# Patient Record
Sex: FEMALE | Race: ASIAN | NOT HISPANIC OR LATINO | Employment: FULL TIME | URBAN - METROPOLITAN AREA
[De-identification: names, ages, dates, MRNs, and addresses within clinical notes are randomized per-mention and may not be internally consistent; named-entity substitution may affect disease eponyms.]

---

## 2020-09-01 ENCOUNTER — SURGERY - HEALTHEAST (OUTPATIENT)
Dept: SURGERY | Facility: CLINIC | Age: 40
End: 2020-09-01

## 2020-09-01 ENCOUNTER — ANESTHESIA - HEALTHEAST (OUTPATIENT)
Dept: SURGERY | Facility: CLINIC | Age: 40
End: 2020-09-01

## 2020-09-01 ASSESSMENT — MIFFLIN-ST. JEOR: SCORE: 1257.36

## 2020-09-02 ENCOUNTER — COMMUNICATION - HEALTHEAST (OUTPATIENT)
Dept: SCHEDULING | Facility: CLINIC | Age: 40
End: 2020-09-02

## 2020-09-16 ENCOUNTER — COMMUNICATION - HEALTHEAST (OUTPATIENT)
Dept: SURGERY | Facility: CLINIC | Age: 40
End: 2020-09-16

## 2021-06-04 VITALS — BODY MASS INDEX: 23.05 KG/M2 | WEIGHT: 135 LBS | HEIGHT: 64 IN

## 2021-06-11 NOTE — ANESTHESIA POSTPROCEDURE EVALUATION
Patient: Jessie Velez  Procedure(s):  APPENDECTOMY, LAPAROSCOPIC  Anesthesia type: general    Patient location: PACU  Last vitals:   Vitals Value Taken Time   /56 9/1/2020  4:10 PM   Temp 36.8  C (98.2  F) 9/1/2020  3:55 PM   Pulse 93 9/1/2020  4:17 PM   Resp 21 9/1/2020  4:17 PM   SpO2 100 % 9/1/2020  4:17 PM   Vitals shown include unvalidated device data.  Post vital signs: stable  Level of consciousness: awake and responds to simple questions  Post-anesthesia pain: pain controlled  Post-anesthesia nausea and vomiting: no  Pulmonary: unassisted, return to baseline  Cardiovascular: stable and blood pressure at baseline  Hydration: adequate  Anesthetic events: no    QCDR Measures:  ASA# 11 - Eleonora-op Cardiac Arrest: ASA11B - Patient did NOT experience unanticipated cardiac arrest  ASA# 12 - Eleonora-op Mortality Rate: ASA12B - Patient did NOT die  ASA# 13 - PACU Re-Intubation Rate: ASA13B - Patient did NOT require a new airway mgmt  ASA# 10 - Composite Anes Safety: ASA10A - No serious adverse event    Additional Notes:

## 2021-06-11 NOTE — ANESTHESIA PREPROCEDURE EVALUATION
Anesthesia Evaluation      Patient summary reviewed   No history of anesthetic complications     Airway   Mallampati: II  Neck ROM: full   Pulmonary - negative ROS and normal exam                          Cardiovascular - negative ROS and normal exam   Neuro/Psych - negative ROS     Endo/Other - negative ROS      GI/Hepatic/Renal - negative ROS           Dental - normal exam                        Anesthesia Plan  Planned anesthetic: general endotracheal    ASA 1 - emergent   Induction: intravenous   Anesthetic plan and risks discussed with: patient and spouse    Post-op plan: routine recovery

## 2021-06-11 NOTE — TELEPHONE ENCOUNTER
Attempted to reach patient for post-operative phone call. Left message for patient asking they return my call to discuss their progress.

## 2021-06-11 NOTE — ANESTHESIA CARE TRANSFER NOTE
Last vitals:   Vitals:    09/01/20 1555   BP: 105/58   Pulse: 100   Resp: 16   Temp: 36.8  C (98.2  F)   SpO2: 100%     Patient's level of consciousness is drowsy  Spontaneous respirations: yes  Maintains airway independently: yes  Dentition unchanged: yes  Oropharynx: oropharynx clear of all foreign objects    QCDR Measures:  ASA# 20 - Surgical Safety Checklist: WHO surgical safety checklist completed prior to induction    PQRS# 430 - Adult PONV Prevention: 4558F - Pt received => 2 anti-emetic agents (different classes) preop & intraop  ASA# 8 - Peds PONV Prevention: NA - Not pediatric patient, not GA or 2 or more risk factors NOT present  PQRS# 424 - Eleonora-op Temp Management: 4559F - At least one body temp DOCUMENTED => 35.5C or 95.9F within required timeframe  PQRS# 426 - PACU Transfer Protocol: - Transfer of care checklist used  ASA# 14 - Acute Post-op Pain: ASA14B - Patient did NOT experience pain >= 7 out of 10

## 2021-06-16 PROBLEM — K35.80 ACUTE APPENDICITIS, UNSPECIFIED ACUTE APPENDICITIS TYPE: Status: ACTIVE | Noted: 2020-09-01

## 2021-06-16 PROBLEM — K37 APPENDICITIS: Status: ACTIVE | Noted: 2020-09-01

## 2024-03-05 ENCOUNTER — LAB REQUISITION (OUTPATIENT)
Dept: LAB | Facility: CLINIC | Age: 44
End: 2024-03-05

## 2024-03-05 DIAGNOSIS — Z13.6 ENCOUNTER FOR SCREENING FOR CARDIOVASCULAR DISORDERS: ICD-10-CM

## 2024-03-05 DIAGNOSIS — Z13.1 ENCOUNTER FOR SCREENING FOR DIABETES MELLITUS: ICD-10-CM

## 2024-03-05 PROCEDURE — 80061 LIPID PANEL: CPT | Performed by: STUDENT IN AN ORGANIZED HEALTH CARE EDUCATION/TRAINING PROGRAM

## 2024-03-05 PROCEDURE — 80048 BASIC METABOLIC PNL TOTAL CA: CPT | Performed by: STUDENT IN AN ORGANIZED HEALTH CARE EDUCATION/TRAINING PROGRAM

## 2024-03-07 LAB
ANION GAP SERPL CALCULATED.3IONS-SCNC: 12 MMOL/L (ref 7–15)
BUN SERPL-MCNC: 8.7 MG/DL (ref 6–20)
CALCIUM SERPL-MCNC: 9.7 MG/DL (ref 8.6–10)
CHLORIDE SERPL-SCNC: 103 MMOL/L (ref 98–107)
CHOLEST SERPL-MCNC: 211 MG/DL
CREAT SERPL-MCNC: 0.63 MG/DL (ref 0.51–0.95)
DEPRECATED HCO3 PLAS-SCNC: 24 MMOL/L (ref 22–29)
EGFRCR SERPLBLD CKD-EPI 2021: >90 ML/MIN/1.73M2
FASTING STATUS PATIENT QL REPORTED: ABNORMAL
GLUCOSE SERPL-MCNC: 88 MG/DL (ref 70–99)
HDLC SERPL-MCNC: 71 MG/DL
LDLC SERPL CALC-MCNC: 128 MG/DL
NONHDLC SERPL-MCNC: 140 MG/DL
POTASSIUM SERPL-SCNC: 3.9 MMOL/L (ref 3.4–5.3)
SODIUM SERPL-SCNC: 139 MMOL/L (ref 135–145)
TRIGL SERPL-MCNC: 60 MG/DL

## 2024-04-30 ENCOUNTER — HOSPITAL ENCOUNTER (EMERGENCY)
Facility: CLINIC | Age: 44
Discharge: HOME | End: 2024-04-30
Attending: EMERGENCY MEDICINE | Admitting: EMERGENCY MEDICINE
Payer: COMMERCIAL

## 2024-04-30 VITALS
RESPIRATION RATE: 18 BRPM | BODY MASS INDEX: 24.16 KG/M2 | SYSTOLIC BLOOD PRESSURE: 124 MMHG | DIASTOLIC BLOOD PRESSURE: 66 MMHG | TEMPERATURE: 98.6 F | HEIGHT: 64 IN | WEIGHT: 141.5 LBS | OXYGEN SATURATION: 99 % | HEART RATE: 115 BPM

## 2024-04-30 DIAGNOSIS — R19.7 DIARRHEA, UNSPECIFIED TYPE: ICD-10-CM

## 2024-04-30 LAB
HOLD SPECIMEN: NORMAL
HOLD SPECIMEN: NORMAL

## 2024-04-30 PROCEDURE — 99284 EMERGENCY DEPT VISIT MOD MDM: CPT

## 2024-04-30 RX ORDER — LOPERAMIDE HYDROCHLORIDE 2 MG/1
2 TABLET ORAL 4 TIMES DAILY PRN
Qty: 15 TABLET | Refills: 0 | Status: SHIPPED | OUTPATIENT
Start: 2024-04-30

## 2024-04-30 RX ORDER — ONDANSETRON 4 MG/1
4 TABLET, ORALLY DISINTEGRATING ORAL EVERY 8 HOURS PRN
Qty: 10 TABLET | Refills: 0 | Status: SHIPPED | OUTPATIENT
Start: 2024-04-30 | End: 2024-05-03

## 2024-04-30 ASSESSMENT — COLUMBIA-SUICIDE SEVERITY RATING SCALE - C-SSRS
1. IN THE PAST MONTH, HAVE YOU WISHED YOU WERE DEAD OR WISHED YOU COULD GO TO SLEEP AND NOT WAKE UP?: NO
6. HAVE YOU EVER DONE ANYTHING, STARTED TO DO ANYTHING, OR PREPARED TO DO ANYTHING TO END YOUR LIFE?: NO
2. HAVE YOU ACTUALLY HAD ANY THOUGHTS OF KILLING YOURSELF IN THE PAST MONTH?: NO

## 2024-04-30 NOTE — ED TRIAGE NOTES
Pt presents to the ED with c/o N/D, and fatigue x 3days. Pt reports watery stools, states yellow in color. Endorses abdominal cramping. Has had appendix removed in the past. Endorsed low grade fever on Saturday evening.      Triage Assessment (Adult)       Row Name 04/30/24 1517          Triage Assessment    Airway WDL WDL        Respiratory WDL    Respiratory WDL WDL        Skin Circulation/Temperature WDL    Skin Circulation/Temperature WDL WDL        Cardiac WDL    Cardiac WDL X;rhythm     Pulse Rate & Regularity tachycardic        Peripheral/Neurovascular WDL    Peripheral Neurovascular WDL WDL        Cognitive/Neuro/Behavioral WDL    Cognitive/Neuro/Behavioral WDL WDL

## 2024-04-30 NOTE — ED PROVIDER NOTES
EMERGENCY DEPARTMENT ENCOUNTER      NAME: Jessie Velez  AGE: 43 year old female  YOB: 1980  MRN: [id number]  EVALUATION DATE & TIME: No admission date for patient encounter.    PCP: Rebecca Barraza    ED PROVIDER: Speedy Fontanez M.D.      Chief Complaint   Patient presents with    Nausea    Diarrhea    Fatigue         FINAL IMPRESSION:  No diagnosis found.      ED COURSE & MEDICAL DECISION MAKING:    Pertinent Labs & Imaging studies reviewed. (See chart for details)  43 year old female presents to the Emergency Department for evaluation of diarrhea.  Patient reports ongoing diarrhea for much the last 4 to 5 days.  Seem to improve briefly only to worsen once again today.  Now with watery stools.  Denies any significant abdominal pain.  No vomiting.  No obvious unusual exposures may be attributing.  No exotic travel.  No other family members ill.  On exam she was well in appearance.  Heart rate is slightly elevated at 115 but the remainder vital signs normal.  Abdomen is soft and nontender.  Given overall wellbeing and benign abdominal exam no indications for laboratory evaluation or imaging.  Symptomatic relief discussed.  Prescriptions for Imodium and Zofran given.  Routine return precautions given.. Patient appears non toxic with stable vitals signs. Overall exam is benign.          4:01 PM  I met with the patient for the initial interview and physical examination. Discussed plan for treatment and workup in the ED.      At the conclusion of the encounter I discussed the results of all of the tests and the disposition. The questions were answered and return precautions provided. The patient or family acknowledged understanding and was agreeable with the care plan.       PPE: Provider wore paper mask.     MEDICATIONS GIVEN IN THE EMERGENCY:  Medications - No data to display    NEW PRESCRIPTIONS STARTED AT TODAY'S ER VISIT  Discharge Medication List as of 4/30/2024  4:06 PM        START taking these  medications    Details   loperamide (IMODIUM A-D) 2 MG tablet Take 1 tablet (2 mg) by mouth 4 times daily as needed for diarrhea, Disp-15 tablet, R-0, Local Print      ondansetron (ZOFRAN ODT) 4 MG ODT tab Take 1 tablet (4 mg) by mouth every 8 hours as needed, Disp-10 tablet, R-0, Local Print                 =================================================================    HPI    Patient information was obtained from: Patient    Use of Intrepreter: N/A         Jessie Velez is a 43 year old female with a pertient medical history of s/p appendectomy who presents to the ED for evaluation of nausea, diarrhea, and fatigue.     Patient reports experiencing an upset stomach starting Saturday, along with a decrease in appetite. She notes that she started to experiencing increasing fatigue on Sunday. She states that she has been experiencing constipation until Monday. She now reports experiencing 3 episodes of diarrhea today, along with intermittent middle upper abdominal cramping, which she rates a 3/10. She notes that she has been experiencing reflux at night, and has been taking antacid tablets with relief. She states that she has had an increase in consumption of spicy foods recently. She notes that she has a family history of GERD. Denies any     Patient denies any recent vomiting, chest pain, shortness of breath, lower extremity swelling, one-sided weakness, numbness/tingling, or any changes in medication. No other complaints at this time.       REVIEW OF SYSTEMS   Constitutional:  Denies fever, chills  Respiratory:  Denies productive cough or increased work of breathing  Cardiovascular:  Denies chest pain, palpitations  GI:  Denies vomiting, or change in bladder habits. Endorses nausea and middle upper abdominal cramping.  Musculoskeletal:  Denies any new muscle/joint swelling  Skin:  Denies rash   Neurologic:  Denies focal weakness  All systems negative except as marked.     PAST MEDICAL HISTORY:  No past  medical history on file.    PAST SURGICAL HISTORY:  Past Surgical History:   Procedure Laterality Date    NE LAP,APPENDECTOMY N/A 9/1/2020    Procedure: APPENDECTOMY, LAPAROSCOPIC;  Surgeon: Aamir Ferguson MD;  Location: [facility];  Service: General         CURRENT MEDICATIONS:    No current facility-administered medications for this encounter.    Current Outpatient Medications:     bismuth subsalicylate (PEPTO BISMOL) 262 mg/15 mL suspension, [BISMUTH SUBSALICYLATE (PEPTO BISMOL) 262 MG/15 ML SUSPENSION] Take 15-30 mL by mouth every 6 (six) hours as needed for indigestion., Disp: , Rfl:     calcium, as carbonate, (OS-TING) 500 mg calcium (1,250 mg) tablet, [CALCIUM, AS CARBONATE, (OS-TING) 500 MG CALCIUM (1,250 MG) TABLET] Take 1 tablet by mouth 2 (two) times a day., Disp: , Rfl:     cholecalciferol, vitamin D3, 1,000 unit (25 mcg) tablet, [CHOLECALCIFEROL, VITAMIN D3, 1,000 UNIT (25 MCG) TABLET] Take 1,000 Units by mouth daily., Disp: , Rfl:     fluticasone propionate (FLONASE) 50 mcg/actuation nasal spray, [FLUTICASONE PROPIONATE (FLONASE) 50 MCG/ACTUATION NASAL SPRAY] Apply 1 spray into each nostril daily as needed for rhinitis or allergies., Disp: , Rfl:     glucosamine-chondroitin 500-400 mg cap, [GLUCOSAMINE-CHONDROITIN 500-400 MG CAP] Take 1 capsule by mouth daily., Disp: , Rfl:     HYDROmorphone (DILAUDID) 2 MG tablet, [HYDROMORPHONE (DILAUDID) 2 MG TABLET] Take 1 tablet (2 mg total) by mouth every 4 (four) hours as needed for pain., Disp: 14 tablet, Rfl: 0    multivitamin therapeutic tablet, [MULTIVITAMIN THERAPEUTIC TABLET] Take 1 tablet by mouth daily., Disp: , Rfl:     omega 3-dha-epa-fish oil (FISH OIL) 60- mg cap capsule, [OMEGA 3-DHA-EPA-FISH OIL (FISH OIL) 60- MG CAP CAPSULE] Take 500 mg by mouth daily., Disp: , Rfl:     ALLERGIES:  Allergies   Allergen Reactions    Compazine [Prochlorperazine] Unknown     shaking       FAMILY HISTORY:  No family history on file.    SOCIAL  HISTORY:   Social History     Socioeconomic History    Marital status:    Tobacco Use    Smoking status: Never    Smokeless tobacco: Never   Substance and Sexual Activity    Alcohol use: Never    Drug use: Never       VITALS:  Patient Vitals for the past 24 hrs:   BP Temp Temp src Pulse Resp SpO2 Weight   04/30/24 1516 (!) 140/70 98.6  F (37  C) Temporal 118 18 99 % 63.5 kg (140 lb)        PHYSICAL EXAM    Constitutional:  Awake, alert, in no apparent distress  HENT:  Normocephalic, Atraumatic. Bilateral external ears normal. Oropharynx moist. Nose normal. Neck- Normal range of motion with no guarding, Supple, No stridor.   Eyes:  PERRL, EOMI with no signs of entrapment, Conjunctiva normal, No discharge.   Respiratory:  Normal breath sounds, No respiratory distress, No wheezing.    Cardiovascular:  Normal heart rate, Normal rhythm, No appreciable rubs or gallops.   GI:  Soft, No tenderness, No distension, No palpable masses  Musculoskeletal: No edema. Good range of motion in all major joints. No tenderness to palpation or major deformities noted.  Integument:  Warm, Dry, No erythema, No rash.   Neurologic:  Alert & oriented, Normal motor function, Normal sensory function, No focal deficits noted.   Psychiatric:  Affect normal, Judgment normal, Mood normal.         I, Usha Arceo, am serving as a scribe to document services personally performed by Speedy Fontanez MD, based on my observation and the provider's statements to me. I, Speedy Fontanez MD attest that Usha Arceo is acting in a scribe capacity, has observed my performance of the services and has documented them in accordance with my direction.    Speedy Fontanez M.D.  Emergency Medicine  [facility] EMERGENCY ROOM     Speedy Fontanez MD  04/30/24 8023

## 2024-05-08 ENCOUNTER — TRANSFERRED RECORDS (OUTPATIENT)
Dept: MULTI SPECIALTY CLINIC | Facility: CLINIC | Age: 44
End: 2024-05-08

## 2024-05-11 ENCOUNTER — HEALTH MAINTENANCE LETTER (OUTPATIENT)
Age: 44
End: 2024-05-11

## 2025-01-23 ASSESSMENT — ANXIETY QUESTIONNAIRES
GAD7 TOTAL SCORE: 12
1. FEELING NERVOUS, ANXIOUS, OR ON EDGE: MORE THAN HALF THE DAYS
IF YOU CHECKED OFF ANY PROBLEMS ON THIS QUESTIONNAIRE, HOW DIFFICULT HAVE THESE PROBLEMS MADE IT FOR YOU TO DO YOUR WORK, TAKE CARE OF THINGS AT HOME, OR GET ALONG WITH OTHER PEOPLE: SOMEWHAT DIFFICULT
8. IF YOU CHECKED OFF ANY PROBLEMS, HOW DIFFICULT HAVE THESE MADE IT FOR YOU TO DO YOUR WORK, TAKE CARE OF THINGS AT HOME, OR GET ALONG WITH OTHER PEOPLE?: SOMEWHAT DIFFICULT
7. FEELING AFRAID AS IF SOMETHING AWFUL MIGHT HAPPEN: MORE THAN HALF THE DAYS
5. BEING SO RESTLESS THAT IT IS HARD TO SIT STILL: NOT AT ALL
GAD7 TOTAL SCORE: 12
GAD7 TOTAL SCORE: 12
4. TROUBLE RELAXING: SEVERAL DAYS
3. WORRYING TOO MUCH ABOUT DIFFERENT THINGS: MORE THAN HALF THE DAYS
6. BECOMING EASILY ANNOYED OR IRRITABLE: NEARLY EVERY DAY
7. FEELING AFRAID AS IF SOMETHING AWFUL MIGHT HAPPEN: MORE THAN HALF THE DAYS
2. NOT BEING ABLE TO STOP OR CONTROL WORRYING: MORE THAN HALF THE DAYS

## 2025-01-28 ENCOUNTER — OFFICE VISIT (OUTPATIENT)
Dept: FAMILY MEDICINE | Facility: CLINIC | Age: 45
End: 2025-01-28
Payer: COMMERCIAL

## 2025-01-28 VITALS
WEIGHT: 148.4 LBS | HEART RATE: 82 BPM | SYSTOLIC BLOOD PRESSURE: 108 MMHG | OXYGEN SATURATION: 98 % | BODY MASS INDEX: 25.33 KG/M2 | HEIGHT: 64 IN | DIASTOLIC BLOOD PRESSURE: 74 MMHG | TEMPERATURE: 97.8 F | RESPIRATION RATE: 14 BRPM

## 2025-01-28 DIAGNOSIS — F32.1 CURRENT MODERATE EPISODE OF MAJOR DEPRESSIVE DISORDER, UNSPECIFIED WHETHER RECURRENT (H): ICD-10-CM

## 2025-01-28 DIAGNOSIS — Z76.89 ENCOUNTER TO ESTABLISH CARE: Primary | ICD-10-CM

## 2025-01-28 DIAGNOSIS — F41.1 GAD (GENERALIZED ANXIETY DISORDER): ICD-10-CM

## 2025-01-28 DIAGNOSIS — N95.1 PERIMENOPAUSAL SYMPTOMS: ICD-10-CM

## 2025-01-28 PROCEDURE — G2211 COMPLEX E/M VISIT ADD ON: HCPCS

## 2025-01-28 PROCEDURE — 99204 OFFICE O/P NEW MOD 45 MIN: CPT

## 2025-01-28 PROCEDURE — 96127 BRIEF EMOTIONAL/BEHAV ASSMT: CPT

## 2025-01-28 RX ORDER — UBIDECARENONE 100 MG
100 CAPSULE ORAL DAILY
COMMUNITY

## 2025-01-28 ASSESSMENT — PATIENT HEALTH QUESTIONNAIRE - PHQ9
10. IF YOU CHECKED OFF ANY PROBLEMS, HOW DIFFICULT HAVE THESE PROBLEMS MADE IT FOR YOU TO DO YOUR WORK, TAKE CARE OF THINGS AT HOME, OR GET ALONG WITH OTHER PEOPLE: VERY DIFFICULT
SUM OF ALL RESPONSES TO PHQ QUESTIONS 1-9: 16
SUM OF ALL RESPONSES TO PHQ QUESTIONS 1-9: 16

## 2025-01-28 ASSESSMENT — PAIN SCALES - GENERAL: PAINLEVEL_OUTOF10: NO PAIN (0)

## 2025-01-28 NOTE — PATIENT INSTRUCTIONS
"Menopause Supplements and Therapies:    Book- \"Hormone Roxana\" by Dr. Lesa Leach    Herbs/supplements for menopause symptoms:     -Sibiric Rhubarb (active ingredient in Estrovera)-  1 tablet of Estrovera daily. Can help with hot flashes, mood swings, anxiety, brain fog, and insomnia.    -Courtney (Lepidium Peruvianum)- 1 gram twice a day.  Adaptogenic plant- helps with dealing with biochemical, physical and emotional stress. Can help with hot flashes, insomnia, nervousness, depressions, heart palpitations.  Also associated with weight loss, decreased blood pressure and increase in HDL cholesterol.     -Black cohosh- 40-80 mg/day.  Can be helpful for hot flashes.     -Chaste berry (Vitex agnus castus)- 250-500 mg/day.  Can be helpful for irregular bleeding, hot flashes, body aches, sleep disturbances.     -Red clover- 80 mg per day.  Phytoestrogen- can help with hot flashes.     -Pine Bark (Pycnogenol)- 30 mg twice a day.  Can help with hot flashes, depression, memory, anxiety and sexual functioning.    -Flax seed (ground) 1-2 tablespoons twice a day.  Phytoestrogen, fiber.     -Rolan leaf- 500 mg twice a day.   Can help with excessive sweating, hot flashes, night sweats.     -Motherwort- 500 mg 2-3x/day.   Calming effect on the heart- ease palpitations, can help regulate menses, reduce anxiety.     -Shatavari- 500-1000 mg twice a day.  Can be helpful for moodiness, GI inflammation/IBS.     -Fish oil- 1,000-2,000 mg/day of a good quality fish oil (ProEPA by [employer] or [employer] are particularly good brands.)     -Inositol- 1-4 grams 2-3x/day.    Can be helpful for sleep, anxiety, depression.     -Yarrow- 500-1000 mg 3-4x/day.  Can be used as needed for heavy menstrual bleeding.     Herbs for sleep:  -Hops- 300-600 mg 1-3x/day or larger dose at bedtime  -Lemon balm- 500-1000 mg 2-3x/day  -Valerian root- 2-3 grams @ bedtime.     Lifestyle:  Anti-inflammatory diet, regular exercise, weight " management, stress reduction, adequate sleep.      Guided imagery:     For menopause:  http://[email or web address]_[email or web address]?[email or web address]&catid=     For sleep:  http://[email or web address]_[email or web address]?[email or web address]&catid=     Supplements:   [facility]  [address], [place] [address]  Store Phone Number: [phone]    [facility]: supplements, compounded hormones, saliva hormone testing  [address] [place] [address] [phone] Phone

## 2025-01-28 NOTE — PROGRESS NOTES
Assessment & Plan     Encounter to establish care  Patient presents today to establish care with myself.  Past medical, social, surgical, family history were reviewed and updated.    Current moderate episode of major depressive disorder, unspecified whether recurrent (H)  GISELLA (generalized anxiety disorder)  PHQ-9 score today 16   GISELLA-7 score today 12   No SI/HI  Medications: Start fluoxetine 20 mg once daily. Medication side effects reviewed. Take in the AM with food to start.   Therapy: Continue to follow with talk therapist as they indicate  Recommended mindfulness, meditation, and exercise. Sleep hygiene.   Follow up: 2-3 months for med check  - FLUoxetine (PROZAC) 20 MG capsule  Dispense: 90 capsule; Refill: 0    Perimenopausal symptoms  Dealing with weight gain, hormonal acne, and irritability. With family history of breast cancer in her mother and maternal aunt, I would recommend she either see gynecology or a  to further assess her breast cancer risk prior to starting any hormonal treatment. Recommend OTC supplements and supportive and this information is provided in the AVS.     The longitudinal plan of care for the diagnosis(es)/condition(s) as documented were addressed during this visit. Due to the added complexity in care, I will continue to support Jessie in the subsequent management and with ongoing continuity of care.        Depression Screening Follow Up        1/28/2025     3:32 PM   PHQ   PHQ-9 Total Score 16    Q9: Thoughts of better off dead/self-harm past 2 weeks Not at all       Patient-reported       Follow Up Actions Taken  Crisis resource information provided in After Visit Summary  Started patient on anti-depressant.  Continue following with talk therapist        Skye   Jessie is a 44 year old, presenting for the following health issues:  Establish Care (Pt is fasting. )        1/28/2025     3:37 PM   Additional Questions   Roomed by Michell GALVAN LPN     Via the Health  Maintenance questionnaire, the patient has reported the following services have been completed -Colonscopy: JESUS Austin 2024-05-08, this information has been sent to the abstraction team.  History of Present Illness       Mental Health Follow-up:  Patient presents to follow-up on Depression & Anxiety.Patient's depression since last visit has been:  No change  The patient is not having other symptoms associated with depression.  Patient's anxiety since last visit has been:  No change  The patient is not having other symptoms associated with anxiety.  Any significant life events: relationship concerns and grief or loss  Patient is feeling anxious or having panic attacks.  Patient has no concerns about alcohol or drug use.    Reason for visit:  1) Start antidepressant medication. 2) Start oral minoxidil. 3) Discuss perimenopause symptoms and treatment options.  Symptom onset:  More than a month  Symptoms include:  1) Excessive worrying, lethargy, anhedonia, insomnia, excessive sleeping. 2) Hair thinning and loss. 3) Sudden weight gain, acne, body composition changes, fatigue, night sweats, extreme irritability, joint/muscle pain.  Symptom intensity:  Moderate  Symptom progression:  Worsening  Had these symptoms before:  No   She is taking medications regularly.     Anxiety/depression: Started talk therapy back in March 2023. Feels symptoms have been very up and down, especially this past winter. Denies any SI/HI. Reports she has never taken any medications. Reports she continues to follow with therapy.     Perimenopause: periods are still regular. However, dealing with sudden weight gain, and hormonal acne, noticing increased irritability       Review of Systems  Constitutional, HEENT, cardiovascular, pulmonary, gi and gu systems are negative, except as otherwise noted.      Objective    /74 (BP Location: Left arm, Patient Position: Sitting, Cuff Size: Adult Regular)   Pulse 82   Temp 97.8  F (36.6  C) (Oral)    "Resp 14   Ht 1.626 m (5' 4\")   Wt 67.3 kg (148 lb 6.4 oz)   LMP 01/14/2025 (Exact Date)   SpO2 98%   Breastfeeding No   BMI 25.47 kg/m    Body mass index is 25.47 kg/m .  Physical Exam   GENERAL: alert and no distress  EYES: Eyes grossly normal to inspection, conjunctivae and sclerae normal  NECK: no visualized asymmetry, masses, or scars  RESP: normal respiratory effort  SKIN: no suspicious lesions or rashes on visualized skin  PSYCH: mentation appears normal, affect normal/bright        1/23/2025    10:14 PM   GISELLA-7 SCORE   Total Score 12 (moderate anxiety)   Total Score 12        Patient-reported           1/28/2025     3:32 PM   PHQ   PHQ-9 Total Score 16    Q9: Thoughts of better off dead/self-harm past 2 weeks Not at all       Patient-reported             Signed Electronically by: Saritha Delgadillo PA-C    "

## 2025-03-04 ENCOUNTER — PATIENT OUTREACH (OUTPATIENT)
Dept: CARE COORDINATION | Facility: CLINIC | Age: 45
End: 2025-03-04
Payer: COMMERCIAL

## 2025-03-07 PROBLEM — K35.80 ACUTE APPENDICITIS, UNSPECIFIED ACUTE APPENDICITIS TYPE: Status: RESOLVED | Noted: 2020-09-01 | Resolved: 2025-03-07

## 2025-03-07 PROBLEM — F41.1 GAD (GENERALIZED ANXIETY DISORDER): Status: ACTIVE | Noted: 2025-03-07

## 2025-03-07 PROBLEM — F33.0 MILD EPISODE OF RECURRENT MAJOR DEPRESSIVE DISORDER: Status: ACTIVE | Noted: 2025-03-07

## 2025-03-07 PROBLEM — K37 APPENDICITIS: Status: RESOLVED | Noted: 2020-09-01 | Resolved: 2025-03-07

## 2025-03-10 PROBLEM — E05.90 SUBCLINICAL HYPERTHYROIDISM: Status: ACTIVE | Noted: 2025-03-10

## 2025-03-21 ENCOUNTER — HOSPITAL ENCOUNTER (OUTPATIENT)
Dept: MAMMOGRAPHY | Facility: CLINIC | Age: 45
Discharge: HOME | End: 2025-03-21
Payer: COMMERCIAL

## 2025-03-21 DIAGNOSIS — Z12.31 VISIT FOR SCREENING MAMMOGRAM: ICD-10-CM

## 2025-03-21 PROCEDURE — 77067 SCR MAMMO BI INCL CAD: CPT

## 2025-03-21 PROCEDURE — 77063 BREAST TOMOSYNTHESIS BI: CPT

## 2025-07-31 ENCOUNTER — LAB (OUTPATIENT)
Dept: LAB | Facility: CLINIC | Age: 45
End: 2025-07-31
Payer: COMMERCIAL

## 2025-07-31 DIAGNOSIS — E05.90 SUBCLINICAL HYPERTHYROIDISM: ICD-10-CM

## 2025-07-31 LAB — TSH SERPL DL<=0.005 MIU/L-ACNC: 0.33 UIU/ML (ref 0.3–4.2)

## 2025-08-13 ASSESSMENT — PATIENT HEALTH QUESTIONNAIRE - PHQ9
SUM OF ALL RESPONSES TO PHQ QUESTIONS 1-9: 5
SUM OF ALL RESPONSES TO PHQ QUESTIONS 1-9: 5
10. IF YOU CHECKED OFF ANY PROBLEMS, HOW DIFFICULT HAVE THESE PROBLEMS MADE IT FOR YOU TO DO YOUR WORK, TAKE CARE OF THINGS AT HOME, OR GET ALONG WITH OTHER PEOPLE: SOMEWHAT DIFFICULT

## 2025-08-14 ENCOUNTER — OFFICE VISIT (OUTPATIENT)
Dept: PEDIATRICS | Facility: CLINIC | Age: 45
End: 2025-08-14
Payer: COMMERCIAL

## 2025-08-14 VITALS
BODY MASS INDEX: 26.07 KG/M2 | HEART RATE: 74 BPM | HEIGHT: 64 IN | SYSTOLIC BLOOD PRESSURE: 125 MMHG | DIASTOLIC BLOOD PRESSURE: 88 MMHG | TEMPERATURE: 97.7 F | RESPIRATION RATE: 15 BRPM | WEIGHT: 152.7 LBS | OXYGEN SATURATION: 99 %

## 2025-08-14 DIAGNOSIS — Z11.4 SCREENING FOR HIV (HUMAN IMMUNODEFICIENCY VIRUS): Primary | ICD-10-CM

## 2025-08-14 DIAGNOSIS — F51.01 PRIMARY INSOMNIA: ICD-10-CM

## 2025-08-14 DIAGNOSIS — Z11.59 NEED FOR HEPATITIS C SCREENING TEST: ICD-10-CM

## 2025-08-14 DIAGNOSIS — F51.01 PRIMARY INSOMNIA: Primary | ICD-10-CM

## 2025-08-14 PROCEDURE — 3079F DIAST BP 80-89 MM HG: CPT

## 2025-08-14 PROCEDURE — G2211 COMPLEX E/M VISIT ADD ON: HCPCS

## 2025-08-14 PROCEDURE — 99213 OFFICE O/P EST LOW 20 MIN: CPT

## 2025-08-14 PROCEDURE — 3074F SYST BP LT 130 MM HG: CPT

## 2025-08-14 RX ORDER — MINOXIDIL 2.5 MG/1
2.5 TABLET ORAL DAILY
COMMUNITY

## 2025-08-14 RX ORDER — TRAZODONE HYDROCHLORIDE 50 MG/1
50 TABLET ORAL
Qty: 30 TABLET | Refills: 1 | Status: SHIPPED | OUTPATIENT
Start: 2025-08-14

## 2025-08-14 ASSESSMENT — ENCOUNTER SYMPTOMS: FATIGUE: 1

## 2025-08-18 ENCOUNTER — PATIENT OUTREACH (OUTPATIENT)
Dept: CARE COORDINATION | Facility: CLINIC | Age: 45
End: 2025-08-18
Payer: COMMERCIAL